# Patient Record
Sex: MALE | Race: WHITE
[De-identification: names, ages, dates, MRNs, and addresses within clinical notes are randomized per-mention and may not be internally consistent; named-entity substitution may affect disease eponyms.]

---

## 2017-03-28 NOTE — CR
EXAMINATION: Right tibia and fibula

 

HISTORY: Fracture

 

COMPARISON: 2/16/2017

 

TECHNIQUE: 2 views

 

FINDINGS/IMPRESSION: An intramedullary elissa is noted fixating a distal tibia fracture, unchanged in p
osition. There is also a stable mildly comminuted mid fibular fracture noted.

## 2017-05-11 NOTE — CR
EXAMINATION: Right tibia and fibula

 

HISTORY: Post procedural states

 

COMPARISON: 3/28/2017

 

TECHNIQUE: 2 views

 

FINDINGS/IMPRESSION: There is a healing distal tibial fracture secured by an intramedullary elissa. The
re is also a healing mildly comminuted nondisplaced mid fibular fracture identified. Overall the oss
eous structures appear stable.

## 2017-08-09 NOTE — PCM.POSTAN
POST ANESTHESIA ASSESSMENT





- MENTAL STATUS


Mental Status: Alert, Oriented





- RESPIRATORY


Respiratory Status: Respiratory Rate WNL, Airway Patent, O2 Saturation Stable





- CARDIOVASCULAR


CV Status: Pulse Rate WNL, Blood Pressure Stable





- GASTROINTESTINAL


GI Status: No Symptoms





- POST OP HYDRATION


Hydration Status: Adequate & Stable

## 2017-08-09 NOTE — PCM.OPNOTE
- General Post-Op/Procedure Note


Date of Surgery/Procedure: 08/09/17


Operative Procedure(s): R knee arthroscopy with PMM and HWR R distal tibia


Post-Op Diagnosis: R knee medial meniscus tear.  R distal tibia painful 

retained HW


Anesthesia Technique: General LMA


Primary Surgeon: Nikki Phillips


Assistant: Angela Finch in mLs: 10


Condition: Good


Free Text/Narrative:: 





#[id number]

## 2017-08-09 NOTE — PCM.PREANE
Preanesthetic Assessment





- Anesthesia/Transfusion/Family Hx


Anesthesia History: Prior Anesthesia Without Reaction


Transfusion History: No Prior Transfusion(s)





- Review of Systems


General: No Symptoms


Pulmonary: No Symptoms


Cardiovascular: No Symptoms


Gastrointestinal: No Symptoms


Neurological: No Symptoms


Other: Reports: None





- Physical Assessment


NPO Status Date: 08/08/17


NPO Status Time: 22:00


O2 Sat by Pulse Oximetry: 96


Respiratory Rate: 16


Vital Signs: 





 Last Vital Signs











Temp  96.8 F   08/09/17 06:58


 


Pulse  56 L  08/09/17 06:58


 


Resp  16   08/09/17 06:58


 


BP  127/78   08/09/17 06:58


 


Pulse Ox  96   08/09/17 06:58











Height: 5 ft 10 in


Weight: 215 lb


ASA Class: 1


Mental Status: Alert & Oriented x3


Airway Class: Mallampati = 2


Dentition: Reports: Normal Dentition


Thyro-Mental Finger Breadths: 3


Mouth Opening Finger Breadths: 3


ROM/Head Extension: Full


Lungs: Clear to Auscultation, Normal Respiratory Effort





- Allergies


Allergies/Adverse Reactions: 


 Allergies











Allergy/AdvReac Type Severity Reaction Status Date / Time


 


No Known Allergies Allergy   Verified 01/02/17 19:08














- Blood


Blood Available: No


Product(s) Available: None





- Anesthesia Plan


Free Text/Narrative:: 





GLMA





- Acknowledgements


Anesthesia Type Planned: General Anesthesia


Pt an Appropriate Candidate for the Planned Anesthesia: Yes


Alternatives and Risks of Anesthesia Discussed w Pt/Guardian: Yes


Pt/Guardian Understands and Agrees with Anesthesia Plan: Yes





PreAnesthesia Questionnaire





- Past Health History


Medical/Surgical History: Denies Medical/Surgical History


Cardiovascular History: Reports: None


Respiratory History: Reports: Other (See Below)


Other Respiratory History: pneumonia 2015


Gastrointestinal History: Reports: GERD (controlled on medication)


Musculoskeletal History: Reports: Fracture


Other Musculoskeletal History: fx rt tib fib


Psychiatric History: Reports: Anxiety





- Infectious Disease History


Infectious Disease History: Reports: Chicken Pox, Measles, Mumps





- Past Surgical History


Head Surgeries/Procedures: Reports: None


HEENT Surgical History: Reports: Tonsillectomy


Respiratory Surgical History: Reports: None


Musculoskeletal Surgical History: Reports: Arthroscopic Knee, Shoulder Surgery, 

Other (See Below)


Other Musculoskeletal Surgeries/Procedures:: right knee arthroscopic surgery, 

right shoulder surgery for torn tendon, surgery for fx rt tib fib





- SUBSTANCE USE


Smoking Status *Q: Never Smoker


Second Hand Smoke Exposure: No


Recreational Drug Use History: Yes


Recreational Drug Type: 





- HOME MEDS


Home Medications: 


 Home Meds





Omeprazole Magnesium [Prilosec Otc] 20 mg PO DAILY 01/02/17 [History]











- CURRENT (IN HOUSE) MEDS


Current Meds: 





 Current Medications





Hydrocodone Bitart/Acetaminophen (Norco 325-5 Mg)  1 - 2 tab PO Q4H PRN


   PRN Reason: Pain


Lactated Ringer's (Ringers, Lactated)  1,000 mls @ 100 mls/hr IV ASDIRECTED [facility]


   Last Admin: 08/09/17 06:39 Dose:  100 mls/hr


Cefazolin Sodium/Dextrose 2 gm (/ Premix)  50 mls @ 100 mls/hr IV ONCALL [facility]





Discontinued Medications





Bupivacaine HCl (Sensorcaine-Mpf 0.5%) Confirm Administered Dose 10 ml .ROUTE 

.STK-MED ONE


   Stop: 08/09/17 07:18


Fentanyl (Sublimaze) Confirm Administered Dose 100 mcg .ROUTE .STK-MED ONE


   Stop: 08/09/17 07:10


Lidocaine (Xylocaine-Mpf 2%) Confirm Administered Dose 5 ml .ROUTE .STK-MED ONE


   Stop: 08/09/17 07:10


Lidocaine HCl (Xylocaine 1%) Confirm Administered Dose 20 ml .ROUTE .STK-MED ONE


   Stop: 08/09/17 07:18


Midazolam HCl (Versed 1 Mg/Ml) Confirm Administered Dose 2 mg .ROUTE .STK-MED 

ONE


   Stop: 08/09/17 07:11


Ondansetron HCl (Zofran) Confirm Administered Dose 4 mg .ROUTE .STK-MED ONE


   Stop: 08/09/17 07:10


Propofol (Diprivan  20 Ml) Confirm Administered Dose 200 mg .ROUTE .STK-MED ONE


   Stop: 08/09/17 07:10

## 2017-08-09 NOTE — [id number]
Post Anesthesia Note





- EVALUATION WITHIN 48HRS OF ANESTHETIC


Vital Signs in Normal Range: Yes


Patient Participated in Evaluation: Yes


Respiratory Function Stable: Yes


Airway Patent: Yes


Cardiovascular Function Stable: Yes


Hydration Status Stable: Yes


Pain Control Satisfactory: Yes


Nausea and Vomiting Control Satisfactory: Yes


Mental Status Recovered: Yes





- COMMENTS/OBSERVATIONS


Free Text/Narrative:: 





To home with his wife.

## 2017-08-09 NOTE — OR
SURGEON:

Nikki Phillips MD

 

DATE OF PROCEDURE:  08/09/2017

 

PREOPERATIVE DIAGNOSES:

1. Right knee medial meniscus tear.

2. Painful retained hardware, right distal tibia.

 

POSTOPERATIVE DIAGNOSES:

1. Right knee medial meniscus tear.

2. Painful retained hardware, right distal tibia.

 

PROCEDURES:

1. Right knee arthroscopy with partial medial meniscectomy.

2. Hardware removal, right distal tibia x3 (deep implants).

 

ASSISTANT:

Angela Finch PA-C.

 

ANESTHESIA:

General.

 

ESTIMATED BLOOD LOSS:

10 mL.

 

COMPLICATIONS:

None.

 

DVT PROPHYLAXIS:

None indicated.

 

IMPLANTS USED:

None.

 

BRIEF HISTORY:

Flynn is a 36-year-old male, who has had complaint of progressive right knee

pain following a right tibia fracture which was treated with intramedullary

rodding of the tibia in January of 2017.  He does have a history of previous

right knee pain and has previously undergone a right knee arthroscopy with

partial medial meniscectomy.  He has also developed irritation along the distal

interlocking screws on the distal tibia.  Due to his lack of response to

conservative treatment, I did recommend surgical intervention.  Risks and goals

of the procedure were discussed with the patient and were documented

preoperatively.  He agreed to proceed.  Initially, we planned on taking out one

of the distal interlocking screws, however, upon preoperative evaluation this

morning he did complain of pain at all 3 distal interlocking screws sites due to

their superficial location.  His x-rays were reviewed which showed the distal

tibia fracture to be well healed with intramedullary rodding good position.  At

that time, I recommended removal of all 3 distal interlocking screws.

 

DESCRIPTION OF PROCEDURE:

The patient was properly identified and brought to the operating room.  He was

transferred from the OR cart and placed on the operating room table in supine

position.  General anesthesia was administered.  After adequate anesthesia was

obtained, a well-padded tourniquet was applied to the right lower extremity.

The right lower extremity was then prepped in standard fashion using ChloraPrep

solution.  It was then sterilely draped.  A time-out was performed to ensure

correct site and procedure.  Preoperative antibiotics were given.  The surgical

sites had been marked preoperatively.  An Esmarch was used to exsanguinate the

right lower extremity and the tourniquet was inflated to 250 mmHg.

 

A lateral portal arthrotomy was established.  Blunt trocar and cannula were

introduced into the suprapatellar pouch.  Camera, inflow, and outflow were

assembled.  No significant synovitis or scarring was noted in the suprapatellar

pouch.  The patellofemoral joint was visualized.  The patella appeared to track

centrally.  I then extended down the lateral and medial gutter.  No loose bodies

were identified.

 

I then entered the medial compartment.  A medial portal arthrotomy was

established.  A blunt probe was inserted.  There was found to be an undersurface

tear of the posterior horn of the medial meniscus.  This was treated with a

shaver and debrided.  It was extensively probed and was found to be stable.

There was some truncation of the posterior horn of the medial meniscus,

presumably from his prior surgery.  The medial tibial plateau articular surface

appeared intact.  I did note an approximately 5 mm x 5 mm area of grade 3

chondromalacia along the weightbearing surface of the lateral portion of the

medial femoral condyle.  Some of the cartilage appeared loose and this was

treated with chondroplasty.  It was again probed and the remainder of the

cartilage was found to be stable.  No subchondral bone was exposed.

 

I then entered the notch.  Both the ACL and PCL were visualized and probed and

found to be intact.

 

I then entered the lateral compartment.  The meniscus appeared intact.  It was

extensively probed and found to be stable.  There was a non full-thickness

linear fissure along the posterior lateral aspect of the lateral tibial plateau.

This was probed and found to be stable.  No significant degenerative changes

were noted along the lateral femoral condyle.

 

I then re-entered the patellofemoral joint.  He did have an abundant fat pad

which appeared to be impinging within the patellofemoral joint.  The portion of

the fat pad was resected.  No further impingement was noted with flexion and

extension of the knee.  There was a small area of chondromalacia along the

central portion of the trochlear groove.  This was probed and found to be

intact.  He also had a small area of grade 3 chondromalacia measuring less than

5 mm x 5 mm over the lateral facet of the patella.  No unstable cartilage was

noted.  Instruments were then removed from the knee.  The portal sites were

closed with 3-0 nylon.  Lidocaine 1% was injected along the portal tracts.

 

We then turned our attention to the distal interlocking screws.  An incision was

made over the site of each of the palpable screws.  The subcutaneous tissues

were dissected and the screw was visualized and removed without difficulty.

This was repeated 3 times.  Wounds were then copiously irrigated with saline

solution.  The tourniquet was deflated.  The distal interlocking screw incisions

were closed with 3-0 nylon.  Lidocaine 1% was also injected along the distal

interlocking screw sites.  Xeroform gauze was then placed over all the wounds

and a bulky dressing was applied.  He was awakened from his anesthetic and

transferred back to the operating room cart.  He was brought to recovery room in

stable condition.  All needle and sponge counts were correct.

 

 

MARTHA / JANA

DD:  08/09/2017 09:18:16

DT:  08/09/2017 10:21:58

Job #:  [id number]

## 2017-08-09 NOTE — CR
Procedural fluoroscopy

 

0.9 seconds of fluoroscopy time was employed for orthopedic hardware procedure.

 

Impression: Procedural fluoroscopy as above

## 2020-10-13 ENCOUNTER — HOSPITAL ENCOUNTER (OUTPATIENT)
Dept: HOSPITAL 56 - MW.SDS | Age: 40
Discharge: HOME | End: 2020-10-13
Attending: SURGERY
Payer: COMMERCIAL

## 2020-10-13 VITALS — SYSTOLIC BLOOD PRESSURE: 126 MMHG | DIASTOLIC BLOOD PRESSURE: 75 MMHG | HEART RATE: 53 BPM

## 2020-10-13 DIAGNOSIS — Z98.890: ICD-10-CM

## 2020-10-13 DIAGNOSIS — D12.2: Primary | ICD-10-CM

## 2020-10-13 DIAGNOSIS — K21.9: ICD-10-CM

## 2020-10-13 DIAGNOSIS — Z79.899: ICD-10-CM

## 2020-10-13 DIAGNOSIS — E66.9: ICD-10-CM

## 2020-10-13 DIAGNOSIS — L29.0: ICD-10-CM

## 2020-10-13 DIAGNOSIS — D12.3: ICD-10-CM

## 2020-10-13 DIAGNOSIS — K31.7: ICD-10-CM

## 2020-10-13 DIAGNOSIS — L98.8: ICD-10-CM

## 2020-10-13 DIAGNOSIS — Z88.1: ICD-10-CM

## 2020-10-13 PROCEDURE — 43239 EGD BIOPSY SINGLE/MULTIPLE: CPT

## 2020-10-13 PROCEDURE — 45380 COLONOSCOPY AND BIOPSY: CPT

## 2020-10-13 PROCEDURE — 88312 SPECIAL STAINS GROUP 1: CPT

## 2020-10-13 PROCEDURE — 88305 TISSUE EXAM BY PATHOLOGIST: CPT

## 2020-10-13 NOTE — OR
SURGEON:

RADHA DENIS MD

 

DATE OF PROCEDURE:  10/13/2020

 

PREOPERATIVE DIAGNOSES:

Change in bowel habits, chronic gastroesophageal reflux disease, perianal pain.

 

POSTOPERATIVE DIAGNOSES:

1. Hyperplastic gastric polyp.

2. Gastric lesion.

3. Gastroesophageal reflux disease.

4. Ascending colon polyp.

5. Transverse colon polyp.

 

PROCEDURE PERFORMED:

Diagnostic esophagogastroduodenoscopy and colonoscopy.

 

PRIMARY SURGEON:

Radha Denis MD

 

ANESTHESIA:

MAC.

 

INSTRUMENT USED:

Olympus endoscope and colonoscope.

 

EXTENT OF EXAM:

To the second portion of duodenum, to the cecum.

 

PREPARATION:

Good.

 

LIMITATIONS:

None.

 

INDICATIONS FOR EXAMINATION:

The patient is a 39-year-old male who presented to clinic with the main

complaint of perianal pain.  Upon further questioning, the patient also had

problems with chronic GERD as well as changes in his bowel habits.  The decision

was made to proceed with diagnostic EGD and colonoscopy.  I explained the

procedure, expected perioperative course, and risks.  He verbalized

understanding and wishes to proceed.

 

PROCEDURE IN DETAIL:

The patient was brought into the endoscopy suite and placed in the left lateral

decubitus position.  A time-out was completed verifying the patient's name, age,

date of birth, allergies, and procedure to be performed.  A bite block was

placed in the patient's mouth.  Monitored anesthesia care was induced and

continuous oxygen was provided via nasal cannula throughout the procedure.

After adequate sedation was achieved, a well-lubricated endoscope was placed in

the patient's mouth and advanced under direct visualization to the level of the

second portion of duodenum.  This appeared normal and a photograph was taken.

The scope was then fully withdrawn while examining the color, texture, anatomy,

and integrity of the mucosa of the upper GI tract.  The duodenal mucosa appeared

normal.  A biopsy was taken of the duodenal bulb and sent to pathology.  The

scope was brought into the stomach and a photograph was taken of the pylorus and

GE junction.  The patient appeared to have a small diverticulum or fold of the

gastric mucosa close to the pylorus.  Multiple photographs of this were taken.

The GE junction appeared normal.  The gastric mucosa showed no signs of

inflammation or gross ulceration.  The patient did have some scattered

hyperplastic polyps in the body of the stomach.  Biopsies were taken of the

gastric antrum, body, and fundus and sent for histologic review and H. pylori

testing.  The scope was brought into the distal esophagus and a photograph of

the Z-line was taken.  The Z-line appeared slightly irregular.  The patient

refluxed gastric contents actively during this portion of the procedure.  A

biopsy was taken 1 cm above the Z-line and sent to pathology for histologic

review.  The remainder of the esophagus appeared normal.  The scope was removed

and this portion of procedure terminated.  A digital rectal exam was performed.

This exam was within normal limits.  A well-lubricated colonoscope was inserted

in the rectum and advanced under direct visualization to the level of the cecum.

The cecum was identified by both visual and anatomic landmarks.  A photograph

was taken of the cecal cap.  The scope was then fully withdrawn while examining

the color, texture, anatomy, and integrity of the mucosa from the cecum to the

anal canal.  The terminal ileum appeared normal with no evidence of

inflammation.  In the midportion of the ascending colon, the patient was noted

to have a small sessile polyp.  This was removed in a piecemeal fashion using

cold biopsy forceps.  A similar-appearing polyp was found in the mid transverse

colon.  It was removed with cold biopsy forceps and sent to pathology, labeled

as transverse colon polyp.  The remainder of the colon appeared normal.  The

scope was brought into the rectum and retroflexed to allow visualization of the

anal canal opening.  This appeared normal and a photograph was taken.  The scope

was then straightened out and fully withdrawn.  The cecum to anus time was

greater than 6 minutes.  I closely inspected the anoderm and the area of

previous excoriation along the buttocks crease.  The patient had a barrier cream

over the area and the excoriation appeared to be resolved.  The patient was

awoken and taken to PACU in stable condition.  All counts were complete and

correct at the end of the case.

 

ENDOSCOPIC DIAGNOSES:

1. Hyperplastic gastric polyp.

2. Gastric lesion.

3. Gastroesophageal reflux disease.

4. Ascending colon polyp.

5. Transverse colon polyp.

 

RECOMMENDATIONS:

Follow up in clinic in 2 weeks.

 

 

ZACH BATES

DD:  10/13/2020 12:38:38

DT:  10/13/2020 18:09:21

Job #:  [id number]

## 2020-10-13 NOTE — PCM.PREANE
Preanesthetic Assessment





- Anesthesia/Transfusion/Family Hx


Anesthesia History: Prior Anesthesia Without Reaction


Family History of Anesthesia Reaction: No


Transfusion History: No Prior Transfusion(s)


Intubation History: Unknown





- Review of Systems


General: No Symptoms


Pulmonary: No Symptoms


Cardiovascular: No Symptoms


Gastrointestinal: Hematochezia, Other (GERD)


Neurological: No Symptoms


Other: Reports: None





- Physical Assessment


Height: 5 ft 10 in


Weight: 99.337 kg


ASA Class: 2


Mental Status: Alert & Oriented x3


Airway Class: Mallampati = 1


Dentition: Reports: Normal Dentition, Implants (x1 rt. lower (back))


Thyro-Mental Finger Breadths: 3


Mouth Opening Finger Breadths: 3


ROM/Head Extension: Full


Lungs: Clear to Auscultation, Normal Respiratory Effort


Cardiovascular: Regular Rate, Regular Rhythm





- Allergies


Allergies/Adverse Reactions: 


                                    Allergies











Allergy/AdvReac Type Severity Reaction Status Date / Time


 


erythromycin base Allergy  Acid Reflux Verified 10/13/20 10:23














- Blood


Blood Available: No





- Anesthesia Plan


Pre-Op Medication Ordered: None





- Acknowledgements


Anesthesia Type Planned: MAC


Pt an Appropriate Candidate for the Planned Anesthesia: Yes


Alternatives and Risks of Anesthesia Discussed w Pt/Guardian: Yes


Pt/Guardian Understands and Agrees with Anesthesia Plan: Yes





PreAnesthesia Questionnaire





- Past Health History


Medical/Surgical History: Denies Medical/Surgical History


HEENT History: Reports: None


Cardiovascular History: Reports: None


Respiratory History: Reports: None


Gastrointestinal History: Reports: GERD


Other Gastrointestinal History: alternating constipation & diarrhea


Genitourinary History: Reports: None


Musculoskeletal History: Reports: Fracture


Other Musculoskeletal History: fx rt tib fib


Neurological History: Reports: None


Psychiatric History: Reports: None, Other (See Below) (h/o anxietu/depression)


Endocrine/Metabolic History: Reports: Obesity/BMI 30+ (BMI 31.4)


Hematologic History: Reports: None


Immunologic History: Reports: None


Oncologic (Cancer) History: Reports: None


Dermatologic History: Reports: None





- Infectious Disease History


Infectious Disease History: Reports: Chicken Pox, Measles, Mumps





- Past Surgical History


Head Surgeries/Procedures: Reports: None


HEENT Surgical History: Reports: Tonsillectomy


Cardiovascular Surgical History: Reports: None


Respiratory Surgical History: Reports: None


GI Surgical History: Reports: None


Male  Surgical History: Reports: None


Endocrine Surgical History: Reports: None


Neurological Surgical History: Reports: None


Musculoskeletal Surgical History: Reports: Arthroscopic Knee, ORIF, Shoulder 

Surgery, Other (See Below)


Other Musculoskeletal Surgeries/Procedures:: right knee arthroscopy with 

meniscus repair, right shoulder surgery for torn tendon, surgery for fx rt tib 

fib (ORIF with later painfull hardware removal)


Oncologic Surgical History: Reports: None


Dermatological Surgical History: Reports: None





- SUBSTANCE USE


Tobacco Use Status *Q: Never Tobacco User





- HOME MEDS


Home Medications: 


                                    Home Meds





Omeprazole Magnesium [Prilosec Otc] 20 mg PO DAILY 01/02/17 [History]


Hydrocortisone/Pramoxine [Proctofoam-Hc 1%-1% Foam] 1 applic RECTAL ASDIRECTED 

PRN 10/08/20 [History]











- CURRENT (IN HOUSE) MEDS


Current Meds: 





                               Current Medications





Lactated Ringer's (Ringers, Lactated)  1,000 mls @ 125 mls/hr IV ASDIRECTED AMELIE


Sodium Chloride (Saline Flush)  10 ml FLUSH ASDIRECTED PRN


   PRN Reason: Keep Vein Open


Sodium Chloride (Saline Flush)  2.5 ml FLUSH ASDIRECTED PRN


   PRN Reason: Keep Vein Open


Sodium Chloride (Saline Flush)  10 ml FLUSH ASDIRECTED PRN


   PRN Reason: Keep Vein Open


Sodium Chloride (Saline Flush)  2.5 ml FLUSH ASDIRECTED PRN


   PRN Reason: Keep Vein Open


Sodium Chloride (Normal Saline)  10 ml IV ASDIRECTED PRN


   PRN Reason: IV Use





Discontinued Medications





Fentanyl (Sublimaze) Confirm Administered Dose 100 mcg .ROUTE .STK-MED ONE


   Stop: 10/13/20 10:23


Lidocaine (Xylocaine-Mpf 2%) Confirm Administered Dose 5 ml .ROUTE .STK-MED ONE


   Stop: 10/13/20 10:23


Midazolam HCl (Versed 1 Mg/Ml) Confirm Administered Dose 2 mg .ROUTE .STK-MED 

ONE


   Stop: 10/13/20 10:23


Propofol (Diprivan  20 Ml) Confirm Administered Dose 400 mg .ROUTE .STK-MED ONE


   Stop: 10/13/20 10:23

## 2020-10-13 NOTE — PCM.POSTAN
POST ANESTHESIA ASSESSMENT





- MENTAL STATUS


Mental Status: Alert, Oriented





- VITAL SIGNS


Vital Signs: 


                                Last Vital Signs











Temp  37 C   10/13/20 11:27


 


Pulse  59 L  10/13/20 11:47


 


Resp  16   10/13/20 11:47


 


BP  118/65   10/13/20 11:47


 


Pulse Ox  93 L  10/13/20 11:47














- RESPIRATORY


Respiratory Status: Respiratory Rate WNL, Airway Patent, O2 Saturation Stable





- CARDIOVASCULAR


CV Status: Pulse Rate WNL, Blood Pressure Stable





- GASTROINTESTINAL


GI Status: No Symptoms





- PAIN


Pain Score: 0





- POST OP HYDRATION


Hydration Status: Adequate & Stable





- OBSERVATIONS


Free Text/Narrative:: 





No anesthesia problems

## 2020-10-13 NOTE — PCM.OPNOTE
- General Post-Op/Procedure Note


Date of Surgery/Procedure: 10/13/20


Operative Procedure(s): Diagnostic EGD.  Diagnostic colonoscopy.


Findings: 


Hyperplastic gastric polyp, GERD, gastric lesion, ascending colon polyp x1 and 

transverse colon polyp x1.


Pre Op Diagnosis: Chronic GERD.  Change in bowel habits.  Perianal pain.


Post-Op Diagnosis: Hyperplastic gastric polyp, GERD, gastric lesion, ascending 

colon polyp and transverse colon polyp.


Anesthesia Technique: [facility]


Primary Surgeon: Radha Denis


Complications: None.


Condition: Stable

## 2021-10-26 ENCOUNTER — HOSPITAL ENCOUNTER (OUTPATIENT)
Dept: HOSPITAL 56 - MW.SDS | Age: 41
Discharge: HOME | End: 2021-10-26
Attending: SURGERY
Payer: COMMERCIAL

## 2021-10-26 VITALS — HEART RATE: 56 BPM | DIASTOLIC BLOOD PRESSURE: 67 MMHG | SYSTOLIC BLOOD PRESSURE: 116 MMHG

## 2021-10-26 DIAGNOSIS — K21.9: ICD-10-CM

## 2021-10-26 DIAGNOSIS — Z79.899: ICD-10-CM

## 2021-10-26 DIAGNOSIS — Z12.11: Primary | ICD-10-CM

## 2021-10-26 DIAGNOSIS — Z98.890: ICD-10-CM

## 2021-10-26 DIAGNOSIS — Z86.010: ICD-10-CM

## 2021-10-26 PROCEDURE — 45378 DIAGNOSTIC COLONOSCOPY: CPT

## 2021-10-26 NOTE — [id number]
Post Anesthesia Note





- EVALUATION WITHIN 48HRS OF ANESTHETIC


Vital Signs in Normal Range: Yes


Patient Participated in Evaluation: Yes


Respiratory Function Stable: Yes


Airway Patent: Yes


Cardiovascular Function Stable: Yes


Hydration Status Stable: Yes


Pain Control Satisfactory: Yes


Nausea and Vomiting Control Satisfactory: Yes


Mental Status Recovered: Yes


Vital Signs: 


                                Last Vital Signs











Temp  97.2 F   10/26/21 14:30


 


Pulse  53 L  10/26/21 14:40


 


Resp  11 L  10/26/21 14:40


 


BP  109/55 L  10/26/21 14:40


 


Pulse Ox  96   10/26/21 14:40














- COMMENTS/OBSERVATIONS


Free Text/Narrative:: 





Pt doing well post-op. VSS. No apparent anesthetic complications.





Dr. Gorge Carey

## 2021-10-26 NOTE — PCM.OPNOTE
- General Post-Op/Procedure Note


Date of Surgery/Procedure: 10/26/21


Operative Procedure(s): Diagnostic colonoscopy


Findings: 





Normal colonoscopy 


Pre Op Diagnosis: History of colon polyps


Post-Op Diagnosis: Normal colonoscopy


Anesthesia Technique: MAC


Primary Surgeon: Radha Denis


Condition: Good


Free Text/Narrative:: 


                                 Intake & Output











 10/26/21 10/26/21 10/26/21





 06:59 14:59 22:59


 


Intake Total  600 


 


Balance  600

## 2021-10-26 NOTE — PCM.PREANE
Preanesthetic Assessment





- Procedure


Proposed Procedure: 





Colonoscopy





- Anesthesia/Transfusion/Family Hx


Anesthesia History: Prior Anesthesia Without Reaction


Family History of Anesthesia Reaction: No


Transfusion History: No Prior Transfusion(s)


Intubation History: Unknown





- Review of Systems


General: No Symptoms


Pulmonary: No Symptoms


Cardiovascular: No Symptoms


Gastrointestinal: No Symptoms (GERD well controlled)


Neurological: No Symptoms


Other: Reports: None





- Physical Assessment


NPO Status Date: 10/24/21


NPO Status Time: 21:00 (Solids, >8 hr Liq)


Vital Signs: 





                                Last Vital Signs











Temp  97.3 F   10/26/21 12:13


 


Pulse  47 L  10/26/21 12:13


 


Resp  16   10/26/21 12:13


 


BP  139/79   10/26/21 12:13


 


Pulse Ox  98   10/26/21 12:13











Height: 5 ft 10 in


Weight: 92.986 kg


ASA Class: 2


Mental Status: Alert & Oriented x3


Airway Class: Mallampati = 3


Dentition: Reports: Normal Dentition


Thyro-Mental Finger Breadths: 3


Mouth Opening Finger Breadths: 3


ROM/Head Extension: Full


Lungs: Clear to Auscultation, Normal Respiratory Effort


Cardiovascular: Regular Rate, Regular Rhythm





- Allergies


Allergies/Adverse Reactions: 


                                    Allergies











Allergy/AdvReac Type Severity Reaction Status Date / Time


 


No Known Allergies Allergy   Verified 10/20/21 11:14














- Acknowledgements


Anesthesia Type Planned: General Anesthesia


Pt an Appropriate Candidate for the Planned Anesthesia: Yes


Alternatives and Risks of Anesthesia Discussed w Pt/Guardian: Yes


Pt/Guardian Understands and Agrees with Anesthesia Plan: Yes





PreAnesthesia Questionnaire





- Past Health History


Medical/Surgical History: Denies Medical/Surgical History


HEENT History: Reports: None


Cardiovascular History: Reports: None


Respiratory History: Reports: None


Gastrointestinal History: Reports: Colon Polyp, GERD


Genitourinary History: Reports: None


Musculoskeletal History: Reports: Fracture


Other Musculoskeletal History: fx rt tib fib


Neurological History: Reports: None


Psychiatric History: Reports: None, Other (See Below)


Endocrine/Metabolic History: Reports: None


Hematologic History: Reports: None


Immunologic History: Reports: None


Oncologic (Cancer) History: Reports: None


Dermatologic History: Reports: None





- Infectious Disease History


Infectious Disease History: Reports: Chicken Pox, Measles, Mumps





- Past Surgical History


Head Surgeries/Procedures: Reports: None


HEENT Surgical History: Reports: Tonsillectomy


Cardiovascular Surgical History: Reports: None


Respiratory Surgical History: Reports: None


GI Surgical History: Reports: Colonoscopy, EGD


Male  Surgical History: Reports: None


Endocrine Surgical History: Reports: None


Neurological Surgical History: Reports: None


Musculoskeletal Surgical History: Reports: Arthroscopic Knee, ORIF, Shoulder 

Surgery, Other (See Below)


Other Musculoskeletal Surgeries/Procedures:: right knee arthroscopy with 

meniscus repair, right shoulder surgery for torn tendon, ORIF for fx rt tib fib,

hardware removal to right ankle


Oncologic Surgical History: Reports: None


Dermatological Surgical History: Reports: None





- SUBSTANCE USE


Tobacco Use Status *Q: Never Tobacco User





- HOME MEDS


Home Medications: 


                                    Home Meds





Omeprazole Magnesium [Prilosec Otc] 20 mg PO DAILY 01/02/17 [History]


buPROPion HCL [Bupropion HCl Sr] 150 mg PO DAILY 10/20/21 [History]











- CURRENT (IN HOUSE) MEDS


Current Meds: 





                               Current Medications





Lactated Ringer's (Ringers, Lactated)  1,000 mls @ 125 mls/hr IV ASDIRECTED AMELIE


   Last Admin: 10/26/21 12:18 Dose:  125 mls/hr


   Documented by: 


Sodium Chloride (Sodium Chloride 0.9% 10 Ml Syringe)  10 ml FLUSH ASDIRECTED PRN


   PRN Reason: Keep Vein Open


Sodium Chloride (Sodium Chloride 0.9% 2.5 Ml Syringe)  2.5 ml FLUSH ASDIRECTED 

PRN


   PRN Reason: Keep Vein Open


Sodium Chloride (Sodium Chloride 0.9% 10 Ml Syringe)  10 ml FLUSH ASDIRECTED PRN


   PRN Reason: Keep Vein Open


Sodium Chloride (Sodium Chloride 0.9% 2.5 Ml Syringe)  2.5 ml FLUSH ASDIRECTED 

PRN


   PRN Reason: Keep Vein Open


Sodium Chloride (Sodium Chloride 0.9% 10 Ml Sdv)  10 ml IV ASDIRECTED PRN


   PRN Reason: IV Use physical therapy

## 2021-10-26 NOTE — PCM.POSTAN
POST ANESTHESIA ASSESSMENT





- MENTAL STATUS


Mental Status: Alert, Oriented





- VITAL SIGNS


Vital Signs: 


                                Last Vital Signs











Temp  97.3 F   10/26/21 12:13


 


Pulse  47 L  10/26/21 12:13


 


Resp  16   10/26/21 12:13


 


BP  139/79   10/26/21 12:13


 


Pulse Ox  98   10/26/21 12:13














- RESPIRATORY


Respiratory Status: Respiratory Rate WNL, Airway Patent, O2 Saturation Stable





- CARDIOVASCULAR


CV Status: Pulse Rate WNL, Blood Pressure Stable





- GASTROINTESTINAL


GI Status: No Symptoms





- PAIN


Pain Score: 0





- POST OP HYDRATION


Hydration Status: Adequate & Stable

## 2021-10-27 NOTE — OR
SURGEON:

RADHA DENIS MD

 

DATE OF PROCEDURE:  10/26/2021

 

PREOPERATIVE DIAGNOSIS:

History of colon polyps.

 

POSTOPERATIVE DIAGNOSIS:

History of colon polyps.

 

PROCEDURE PERFORMED:

Screening colonoscopy.

 

PRIMARY SURGEON:

Radha Denis MD

 

ANESTHESIA:

MAC.

 

INSTRUMENT USED:

Olympus colonoscope.

 

EXTENT OF EXAM:

To the cecum.

 

PREPARATION:

Good.

 

LIMITATIONS:

None.

 

INDICATIONS FOR EXAMINATION:

The patient is a 40-year-old male who last year underwent a diagnostic

colonoscopy.  At the time, he was found to have two large polyps in his colon.

Given the advanced nature of these polyps and their size, the decision was made

to proceed with a repeat one-year colonoscopy.  I explained the procedure,

expected perioperative course, and the risks.  The patient verbalized

understanding and wishes to proceed.

 

PROCEDURE IN DETAIL:

The patient was brought in to the endoscopy suite and placed in a left lateral

decubitus position.  A time-out was completed verifying the patient's name, age,

date of birth, allergies, and procedure to be performed.  Monitored anesthesia

care was induced and continuous oxygen was provided via nasal cannula throughout

the procedure.  After adequate sedation was achieved, a digital rectal exam was

performed.  This exam was within normal limits.  A well-lubricated colonoscope

was inserted into the rectum and advanced under direct visualization to the

level of the cecum.  The cecum was identified by both visual and anatomic

landmarks.  A photograph was taken of the cecal cap as well as with the scope

retroflexed within the cecum.  The scope was then fully withdrawn while

examining the color, texture, anatomy, and integrity of the mucosa from the

cecum to the anal canal.  The findings were consistent with normal colonic

mucosa.  The scope was then brought into the rectum and retroflexed to allow

visualization of the anal canal opening.  This appeared normal and a photograph

was taken.  The scope was then straightened out and fully withdrawn.  The cecum

to anus time was 8 minutes.  The patient tolerated the procedure well and was

transferred to the PACU in stable condition.

 

ENDOSCOPIC DIAGNOSIS:

History of colon polyps.

 

RECOMMENDATION:

Follow up in clinic in five years for repeat colonoscopy.

 

 

ZACH BATES

DD:  10/27/2021 11:36:01

DT:  10/27/2021 18:45:22

Job #:  [id number]